# Patient Record
Sex: FEMALE | Race: OTHER
[De-identification: names, ages, dates, MRNs, and addresses within clinical notes are randomized per-mention and may not be internally consistent; named-entity substitution may affect disease eponyms.]

---

## 2019-08-23 ENCOUNTER — HOSPITAL ENCOUNTER (EMERGENCY)
Dept: HOSPITAL 72 - EMR | Age: 24
Discharge: HOME | End: 2019-08-23
Payer: MEDICAID

## 2019-08-23 VITALS — SYSTOLIC BLOOD PRESSURE: 121 MMHG | DIASTOLIC BLOOD PRESSURE: 68 MMHG

## 2019-08-23 VITALS — DIASTOLIC BLOOD PRESSURE: 64 MMHG | SYSTOLIC BLOOD PRESSURE: 122 MMHG

## 2019-08-23 VITALS — BODY MASS INDEX: 32.99 KG/M2 | HEIGHT: 65 IN | WEIGHT: 198 LBS

## 2019-08-23 VITALS — SYSTOLIC BLOOD PRESSURE: 112 MMHG | DIASTOLIC BLOOD PRESSURE: 63 MMHG

## 2019-08-23 DIAGNOSIS — R10.9: ICD-10-CM

## 2019-08-23 DIAGNOSIS — R63.4: ICD-10-CM

## 2019-08-23 DIAGNOSIS — R11.2: ICD-10-CM

## 2019-08-23 DIAGNOSIS — R42: Primary | ICD-10-CM

## 2019-08-23 DIAGNOSIS — M32.9: ICD-10-CM

## 2019-08-23 DIAGNOSIS — R05: ICD-10-CM

## 2019-08-23 LAB
ADD MANUAL DIFF: NO
ALBUMIN SERPL-MCNC: 3.9 G/DL (ref 3.4–5)
ALBUMIN/GLOB SERPL: 0.9 {RATIO} (ref 1–2.7)
ALP SERPL-CCNC: 90 U/L (ref 46–116)
ALT SERPL-CCNC: 22 U/L (ref 12–78)
ANION GAP SERPL CALC-SCNC: 7 MMOL/L (ref 5–15)
APPEARANCE UR: CLEAR
APTT PPP: (no result) S
AST SERPL-CCNC: 21 U/L (ref 15–37)
BASOPHILS NFR BLD AUTO: 1 % (ref 0–2)
BILIRUB SERPL-MCNC: 0.2 MG/DL (ref 0.2–1)
BUN SERPL-MCNC: 13 MG/DL (ref 7–18)
CALCIUM SERPL-MCNC: 9.2 MG/DL (ref 8.5–10.1)
CHLORIDE SERPL-SCNC: 105 MMOL/L (ref 98–107)
CK SERPL-CCNC: 75 U/L (ref 26–308)
CO2 SERPL-SCNC: 26 MMOL/L (ref 21–32)
CREAT SERPL-MCNC: 1 MG/DL (ref 0.55–1.3)
EOSINOPHIL NFR BLD AUTO: 2.6 % (ref 0–3)
ERYTHROCYTE [DISTWIDTH] IN BLOOD BY AUTOMATED COUNT: 12.5 % (ref 11.6–14.8)
GLOBULIN SER-MCNC: 4.3 G/DL
GLUCOSE UR STRIP-MCNC: NEGATIVE MG/DL
HCT VFR BLD CALC: 43.1 % (ref 37–47)
HGB BLD-MCNC: 13.8 G/DL (ref 12–16)
KETONES UR QL STRIP: NEGATIVE
LEUKOCYTE ESTERASE UR QL STRIP: NEGATIVE
LYMPHOCYTES NFR BLD AUTO: 22.1 % (ref 20–45)
MCV RBC AUTO: 88 FL (ref 80–99)
MONOCYTES NFR BLD AUTO: 6 % (ref 1–10)
NEUTROPHILS NFR BLD AUTO: 68.3 % (ref 45–75)
NITRITE UR QL STRIP: NEGATIVE
PH UR STRIP: 8 [PH] (ref 4.5–8)
PHOSPHATE SERPL-MCNC: 3.4 MG/DL (ref 2.5–4.9)
PLATELET # BLD: 353 K/UL (ref 150–450)
POTASSIUM SERPL-SCNC: 3.8 MMOL/L (ref 3.5–5.1)
PROT UR QL STRIP: NEGATIVE
RBC # BLD AUTO: 4.91 M/UL (ref 4.2–5.4)
SODIUM SERPL-SCNC: 138 MMOL/L (ref 136–145)
SP GR UR STRIP: 1.01 (ref 1–1.03)
UROBILINOGEN UR-MCNC: NORMAL MG/DL (ref 0–1)
WBC # BLD AUTO: 10.4 K/UL (ref 4.8–10.8)

## 2019-08-23 PROCEDURE — 74177 CT ABD & PELVIS W/CONTRAST: CPT

## 2019-08-23 PROCEDURE — 81025 URINE PREGNANCY TEST: CPT

## 2019-08-23 PROCEDURE — 82550 ASSAY OF CK (CPK): CPT

## 2019-08-23 PROCEDURE — 71045 X-RAY EXAM CHEST 1 VIEW: CPT

## 2019-08-23 PROCEDURE — 83735 ASSAY OF MAGNESIUM: CPT

## 2019-08-23 PROCEDURE — 80053 COMPREHEN METABOLIC PANEL: CPT

## 2019-08-23 PROCEDURE — 36415 COLL VENOUS BLD VENIPUNCTURE: CPT

## 2019-08-23 PROCEDURE — 84443 ASSAY THYROID STIM HORMONE: CPT

## 2019-08-23 PROCEDURE — 84100 ASSAY OF PHOSPHORUS: CPT

## 2019-08-23 PROCEDURE — 81003 URINALYSIS AUTO W/O SCOPE: CPT

## 2019-08-23 PROCEDURE — 85025 COMPLETE CBC W/AUTO DIFF WBC: CPT

## 2019-08-23 PROCEDURE — 96360 HYDRATION IV INFUSION INIT: CPT

## 2019-08-23 PROCEDURE — 99284 EMERGENCY DEPT VISIT MOD MDM: CPT

## 2019-08-23 NOTE — DIAGNOSTIC IMAGING REPORT
Clinical Indication: Abdominal pain, right-sided abdominal pain, nausea and vomiting

 

Technique:   No oral contrast utilized, per emergency room physician request  IV

administration nonionic contrast. Venous phase spiral acquisition obtained through

the abdomen and pelvis. Multiplanar reconstructions were generated. Total dose length

product 865.62 mGycm. CTDIvol(s) 15.91 mGy. Dose reduction achieved using automated

exposure control

 

 

Comparison: none

 

Findings: Lack of enteric contrast limits assessment of the GI tract. The appendix is

normal. There is equivocal minimal wall thickening of the transverse and distal

descending colon, probably artifact lack of of distention. There is trace free pelvic

fluid. No small bowel distention. No free intraperitoneal gas demonstrated. The

distal esophagus is unremarkable. The stomach is somewhat distended with food. The

duodenum is unremarkable.

 

The liver, gallbladder, bile ducts, pancreas, spleen, adrenals, kidneys are

unremarkable. No renal or ureter calculi, hydronephrosis, or hydroureter

demonstrated. No retroperitoneal or mesenteric mass or adenopathy. No pelvic mass or

adenopathy. Normal uterus and ovaries.

 

The included lung bases are clear. The bones are unremarkable.

 

Impression: Limited assessment of the GI tract, due to lack of enteric contrast

administration.

 

Equivocal minimal wall thickening of the transverse and distal descending colon,

probably artifact lack of distention, but the possibility of colitis should be

considered.

 

Trace free pelvic fluid, presumably physiologic

 

Otherwise unremarkable

 

 

 

 

 

 

 

The CT scanner at [facility] is accredited by the [employer] College of

Radiology and the scans are performed using protocols designed to limit radiation

exposure to as low as reasonably achievable to attain images of sufficient resolution

adequate for diagnostic evaluation.

## 2019-08-23 NOTE — EMERGENCY ROOM REPORT
History of Present Illness


General


Chief Complaint:  Dizziness


Source:  Patient





Present Illness


HPI


Disclaimer: Please note that this report is being documented using DRAGON 

technology. This can lead to erroneous entry secondary to incorrect 

interpretation by the dictating instrument.





HPI: 24-year-old female with history of eczema, multiple pregnancies presents 

for evaluation of nausea/vomiting, diarrhea, lightheadedness, unexplained 

weight loss.  States symptoms have been going on for a month or longer.  She 

states that she has bad almost daily vomiting and nausea both postprandially 

and several hours after eating.  She had an episode of nonbloody diarrhea 

yesterday but this seems to have been resolving.  She states she is feeling 

lightheaded over the past few weeks especially when rising from a seated 

position.  She has fallen several times stating that her knees will buckle.  

She reports intermittent headaches, blurred vision, photophobia and difficulty 

focusing her vision on objects.  She currently denies a headache, chest pain, 

shortness of breath, cough, nausea but states she did vomit just prior to 

arrival.  She denies any dysuria, hematuria, vaginal discharge, vaginal 

bleeding.  She continues to breast-feed.  She had a normal menstrual period 

last month.  Denies any drug or alcohol or tobacco use.  Otherwise only takes 

calcium and magnesium supplements.  Denies any recent fevers, chills, sore 

throat, new rash aside from her eczema.  She also reports a near 20 pound 

weight loss over the past month which is been unintentional.  There is been no 

change in her diet, no change in her exercise level, does not recall any 

fevers.  Denies any issues with thyroid or other hormonal imbalances.


 


PMH: Eczema


 


PSH: Denies


 


Allergies: Seasonal amoxicillin


 


Social Hx: Denies alcohol, drug or tobacco use


Allergies:  


Coded Allergies:  


     AMOXICILLIN (Verified  Allergy, Unknown, 1/26/18)





Patient History


Pregnant Now:  No





Nursing Documentation-PMH


Past Medical History:  No History, Except For


Hx Asthma:  Yes - positive TB test


Hx Neurological Problems:  Yes - LUPUS





Review of Systems


All Other Systems:  negative except mentioned in HPI





Physical Exam





Vital Signs








  Date Time  Temp Pulse Resp B/P (MAP) Pulse Ox O2 Delivery O2 Flow Rate FiO2


 


8/23/19 13:12 98.8 94 18 122/64 (83) 99 Room Air  





 





General: Awake and alert, no acute distress


HEENT: NC/AT. EOMI. PERRLA.  No nystagmus.  Moist mucous membranes.


Neck: Supple, trachea midline


Chest Wall: No tenderness, no deformity


Cardiovascular: RRR.  S1 and S2 normal.  No murmur appreciated


Resp: Normal work of breathing. No cough, wheezing or crackles appreciated


Abdomen: Abdomen is soft, obese, nondistended.  Numbness in the right lower 

quadrant and suprapubic region without rebound.  No right upper quadrant 

tenderness, negative Cruz sign.


Skin: I dry and cracked skin over the back of the neck and upper trapezius 

bilaterally.  No breakdown, no weeping.  There are excoriations over the medial 

malleolus on the right ankle and between the finger webbing of the first and 

second digit on the right hand.


MSK: Normal tone and bulk. Moving all extremities.  No obvious deformity.


Neuro: Awake and alert.  Mentating appropriately.  Facial expressions are 

symmetrical.  Strength is 5/5 in all major joints and muscle groups.  Sensation 

is intact to light touch over the upper and lower extremities.


Back/Spine: No midline tenderness in the cervical, thoracic or lumbosacral 

spine.  Mild paraspinal tenderness and tenderness over the pedis bilaterally





Medical Decision Making


ER Course


24-year-old female presents for evaluation of month-long symptoms including 

postprandial emesis, lightheadedness, weakness, near syncope, headaches, 

abdominal pain, unexplained weight loss.  Given the wide range of symptoms we 

will have to start a full metabolic work-up labs including electrolytes, liver 

function studies, complete blood count, magnesium, phosphorus, UA, pregnancy 

and obtain a CT scan with IV contrast of the abdomen.





Laboratory Tests








Test


  8/23/19


13:34 8/23/19


13:52


 


Urine Color Pale yellow   


 


Urine Appearance Clear   


 


Urine pH 8 (4.5-8.0)   


 


Urine Specific Gravity


  1.010


(1.005-1.035) 


 


 


Urine Protein


  Negative


(NEGATIVE) 


 


 


Urine Glucose (UA)


  Negative


(NEGATIVE) 


 


 


Urine Ketones


  Negative


(NEGATIVE) 


 


 


Urine Blood


  Negative


(NEGATIVE) 


 


 


Urine Nitrite


  Negative


(NEGATIVE) 


 


 


Urine Bilirubin


  Negative


(NEGATIVE) 


 


 


Urine Urobilinogen


  Normal MG/DL


(0.0-1.0) 


 


 


Urine Leukocyte Esterase


  Negative


(NEGATIVE) 


 


 


Urine HCG, Qualitative


  Negative


(NEGATIVE) 


 


 


White Blood Count


  


  10.4 K/UL


(4.8-10.8)


 


Red Blood Count


  


  4.91 M/UL


(4.20-5.40)


 


Hemoglobin


  


  13.8 G/DL


(12.0-16.0)


 


Hematocrit


  


  43.1 %


(37.0-47.0)


 


Mean Corpuscular Volume  88 FL (80-99)  


 


Mean Corpuscular Hemoglobin


  


  28.2 PG


(27.0-31.0)


 


Mean Corpuscular Hemoglobin


Concent 


  32.0 G/DL


(32.0-36.0)


 


Red Cell Distribution Width


  


  12.5 %


(11.6-14.8)


 


Platelet Count


  


  353 K/UL


(150-450)


 


Mean Platelet Volume


  


  6.4 FL


(6.5-10.1)  L


 


Neutrophils (%) (Auto)


  


  68.3 %


(45.0-75.0)


 


Lymphocytes (%) (Auto)


  


  22.1 %


(20.0-45.0)


 


Monocytes (%) (Auto)


  


  6.0 %


(1.0-10.0)


 


Eosinophils (%) (Auto)


  


  2.6 %


(0.0-3.0)


 


Basophils (%) (Auto)


  


  1.0 %


(0.0-2.0)


 


Sodium Level


  


  138 MMOL/L


(136-145)


 


Potassium Level


  


  3.8 MMOL/L


(3.5-5.1)


 


Chloride Level


  


  105 MMOL/L


()


 


Carbon Dioxide Level


  


  26 MMOL/L


(21-32)


 


Anion Gap


  


  7 mmol/L


(5-15)


 


Blood Urea Nitrogen


  


  13 mg/dL


(7-18)


 


Creatinine


  


  1.0 MG/DL


(0.55-1.30)


 


Estimate Glomerular


Filtration Rate 


  > 60 mL/min


(>60)


 


Glucose Level


  


  100 MG/DL


()


 


Calcium Level


  


  9.2 MG/DL


(8.5-10.1)


 


Phosphorus Level


  


  3.4 MG/DL


(2.5-4.9)


 


Magnesium Level


  


  1.8 MG/DL


(1.8-2.4)


 


Total Bilirubin


  


  0.2 MG/DL


(0.2-1.0)


 


Aspartate Amino Transferase


(AST) 


  21 U/L (15-37)


 


 


Alanine Aminotransferase (ALT)


  


  22 U/L (12-78)


 


 


Alkaline Phosphatase


  


  90 U/L


()


 


Total Creatine Kinase


  


  75 U/L


()


 


Total Protein


  


  8.2 G/DL


(6.4-8.2)


 


Albumin


  


  3.9 G/DL


(3.4-5.0)


 


Globulin  4.3 g/dL  


 


Albumin/Globulin Ratio


  


  0.9 (1.0-2.7)


L


 


Thyroid Stimulating Hormone


(TSH) 


  1.668 uiU/mL


(0.358-3.740)








EKG Diagnostic Results


EKG Time:  13:55


Rate:  tachycardiac


Rhythm:  NSR


ST Segments:  no acute changes


Other Impression


Sinus tachycardia with a rate of 102 bpm, normal intervals, normal axis.  No 

acute ischemic changes.





Rhythm Strip Diag. Results


Rhythm Strip Time:  13:55


EP Interpretation:  yes


Rate:  100s


Rhythm:  NSR





Chest X-Ray Diagnostic Results


Chest X-Ray Diagnostic Results :  


   # of Views/Limited/Complete:  1 View


   Indication:  Other - Weight loss


   EP Interpretation:  Yes


   Interpretation:  no consolidation, no effusion, no pneumothorax


   Impression:  No acute disease - No masses appreciated


   Electronically Signed by:  Electronically signed by Dr. Tonio Gan





CT/MRI/US Diagnostic Results


CT/MRI/US Diagnostic Results :  


   Impression


CT abd/pel Impression: Limited assessment of the GI tract, due to lack of 

enteric contrast


administration.


 


Equivocal minimal wall thickening of the transverse and distal descending colon,


probably artifact lack of distention, but the possibility of colitis should be


considered.


 


Trace free pelvic fluid, presumably physiologic


 


Otherwise unremarkable





Last Vital Signs








  Date Time  Temp Pulse Resp B/P (MAP) Pulse Ox O2 Delivery O2 Flow Rate FiO2


 


8/23/19 13:18 98.8  18 122/64 99 Room Air  


 


8/23/19 13:12  94      








Status:  improved


Reevaluation Impression


Blood work returned largely within normal limits.  No significant white count, 

normal renal function, no evidence of urinary tract infection.  CT scan of the 

abdomen showed possible inflammation of the distal transverse colon however 

they noted that this may be artifact.  Given the patient's prolonged symptoms 

and inflammatory condition such as Crohn's, ulcerative colitis, celiac disease 

or others may be the cause of her abdominal discomfort, nausea and vomiting.  

She states her symptoms are now completely resolved and she is returned to her 

baseline.  No lightheadedness or other symptoms reported at this time.  She is 

requesting discharge home on NSAIDs and she will follow-up with her PMD to 

discuss these findings and possible schedule outpatient evaluation by 

gastroenterology as needed.  We discussed reasons to return to the emergency 

department.  The patient was provided with a copy of her CT scan.  She 

understands and agrees with this treatment plan will be discharged home.


Disposition:  HOME, SELF-CARE


Condition:  Improved


Scripts


Ibuprofen* (MOTRIN*) 600 Mg Tablet


600 MG ORAL Q6H PRN for For Pain, #30 TAB 0 Refills


   Prov: Tonio Gan MD         8/23/19











Tonio Gan MD Aug 23, 2019 14:05

## 2019-08-23 NOTE — DIAGNOSTIC IMAGING REPORT
Indication: Cough

 

Technique: One view of the chest

 

Comparison: 9/14/2009

 

Findings: Lungs and pleural spaces are clear. Heart size is normal

 

Impression: No acute process

## 2019-08-23 NOTE — NUR
ED Nurse Note:

pt from home c/o abd pain and dizziness ermd eval done meds given labs sent vss 
ct done pt resting awaiting results

## 2020-10-27 ENCOUNTER — HOSPITAL ENCOUNTER (EMERGENCY)
Dept: HOSPITAL 72 - EMR | Age: 25
Discharge: HOME | End: 2020-10-27
Payer: MEDICAID

## 2020-10-27 VITALS — HEIGHT: 65 IN | WEIGHT: 220 LBS | BODY MASS INDEX: 36.65 KG/M2

## 2020-10-27 VITALS — DIASTOLIC BLOOD PRESSURE: 76 MMHG | SYSTOLIC BLOOD PRESSURE: 115 MMHG

## 2020-10-27 VITALS — SYSTOLIC BLOOD PRESSURE: 115 MMHG | DIASTOLIC BLOOD PRESSURE: 76 MMHG

## 2020-10-27 DIAGNOSIS — R42: ICD-10-CM

## 2020-10-27 DIAGNOSIS — R51.9: Primary | ICD-10-CM

## 2020-10-27 DIAGNOSIS — Z88.1: ICD-10-CM

## 2020-10-27 LAB
APPEARANCE UR: CLEAR
APTT PPP: (no result) S
GLUCOSE UR STRIP-MCNC: NEGATIVE MG/DL
KETONES UR QL STRIP: NEGATIVE
LEUKOCYTE ESTERASE UR QL STRIP: NEGATIVE
NITRITE UR QL STRIP: NEGATIVE
PH UR STRIP: 6 [PH] (ref 4.5–8)
PROT UR QL STRIP: NEGATIVE
SP GR UR STRIP: 1.02 (ref 1–1.03)
UROBILINOGEN UR-MCNC: NORMAL MG/DL (ref 0–1)

## 2020-10-27 PROCEDURE — 96374 THER/PROPH/DIAG INJ IV PUSH: CPT

## 2020-10-27 PROCEDURE — 99284 EMERGENCY DEPT VISIT MOD MDM: CPT

## 2020-10-27 PROCEDURE — 81025 URINE PREGNANCY TEST: CPT

## 2020-10-27 PROCEDURE — 96375 TX/PRO/DX INJ NEW DRUG ADDON: CPT

## 2020-10-27 PROCEDURE — 70450 CT HEAD/BRAIN W/O DYE: CPT

## 2020-10-27 PROCEDURE — 81003 URINALYSIS AUTO W/O SCOPE: CPT

## 2020-10-27 NOTE — NUR
ED Nurse Note:



PT walked in c/o headache and LT eye blurry vision for 2 weeks. Patient denies 
CP, nausea vomitting diarrhea. reports recent psychosocial stressors prior to 
onset of symptoms. changed into gown; attached to monitor. patient ao4 with no 
acute distress. vitals stable ambulatory with steady gait. urine collected; 
sent down to lab. all safety measures met.

## 2020-10-27 NOTE — NUR
ED Nurse Note:

iv access established. blood and urine collected; sent down to lab. medicated 
patient; tolerated well. decreased environmental stimuli; dimmed lights; 
provided with warm blankets.

## 2020-10-27 NOTE — EMERGENCY ROOM REPORT
History of Present Illness


General


Chief Complaint:  Dizziness


Source:  Patient





Present Illness


HPI


This is a 25-year-old female with no past medical history.  She presents when 

she complained of headache and dizziness.  She says she occasionally get 

headache but recently in the last week or so assuming getting it daily.  Usually

around her eyes waiting for back.  She felt nauseous with vomiting.  Worse with 

lights and noise.  Increased stress lately.  She said that her mom was 

hospitalized for Covid and still recovering from that.  Denies any fever chills 

but denies any focal deficit.  Pain is 8 out of 10.  Usually take 

over-the-counter medication but is not helping now.  Never had any work-up as 

far as a CT scan or MRI.


Allergies:  


Coded Allergies:  


     AMOXICILLIN (Verified  Allergy, Unknown, 1/26/18)





COVID-19 Screening


Contact w/high risk pt:  No


Experienced COVID-19 symptoms?:  No


COVID-19 Testing performed PTA:  No





Patient History


Past Medical History:  see triage record, old chart reviewed


Past Surgical History:  none


Pertinent Family History:  none


Social History:  Denies: smoking


Last Menstrual Period:  10/2020


Pregnant Now:  No


Immunizations:  other


Reviewed Nursing Documentation:  PMH: Agreed; PSxH: Agreed





Nursing Documentation-PMH


Hx Asthma:  Yes - false positive TB test


Hx Neurological Problems:  Yes - LUPUS, eczema





Review of Systems


Eye:  Denies: eye pain, blurred vision


ENT:  Denies: ear pain, nose congestion, throat swelling


Respiratory:  Denies: cough, shortness of breath


Cardiovascular:  Denies: chest pain, palpitations


Gastrointestinal:  Denies: abdominal pain, diarrhea, nausea, vomiting


Musculoskeletal:  Denies: back pain, joint pain


Skin:  Denies: rash


Neurological:  Reports: headache; Denies: numbness


Endocrine:  Denies: increased thirst, increased urine


Hematologic/Lymphatic:  Denies: easy bruising


All Other Systems:  negative except mentioned in HPI





Physical Exam





Vital Signs








  Date Time  Temp Pulse Resp B/P (MAP) Pulse Ox O2 Delivery O2 Flow Rate FiO2


 


10/27/20 00:58 98.4 74 16 115/76 (89) 95 Room Air  





Vitals normal


Sp02 EP Interpretation:  reviewed, normal


General Appearance:  well appearing, no apparent distress, alert


Head:  normocephalic, atraumatic


Eyes:  bilateral eye PERRL, bilateral eye EOMI


ENT:  hearing grossly normal, normal pharynx


Neck:  full range of motion, supple, no meningismus


Respiratory:  chest non-tender, lungs clear, normal breath sounds


Cardiovascular #1:  regular rate, rhythm, no murmur


Gastrointestinal:  normal bowel sounds, non tender, no mass, no organomegaly, no

bruit, non-distended


Musculoskeletal:  back normal, normal range of motion, gait/station normal


Psychiatric:  mood/affect normal





Medical Decision Making


Diagnostic Impression:  


   Primary Impression:  


   Headache


   Qualified Codes:  R51.9 - Headache, unspecified


ER Course


Patient presents with headache.  This may be migrainous or tension in nature.  

Worse with anxiety related.  She looks well.  No evidence of meningitis, bleed 

or neoplastic process.  Order CT scan because headache is getting worse and she 

never had any work-up.


CT/MRI/US Diagnostic Results


CT/MRI/US Diagnostic Results :  


   Imaging Test Ordered:  CT head


   Impression


Per radiologist negative





Last Vital Signs








  Date Time  Temp Pulse Resp B/P (MAP) Pulse Ox O2 Delivery O2 Flow Rate FiO2


 


10/27/20 01:08  74 16   Room Air  


 


10/27/20 01:08 98.4   115/76 95   








Status:  improved


Disposition:  HOME, SELF-CARE


Condition:  Stable


Scripts


Meloxicam* (MOBIC*) 15 Mg Tablet


15 MG ORAL DAILY, #30 TAB 0 Refills


   Prov: José Antonio Dodge MD         10/27/20 


Amitriptyline Hcl (AMITRIPTYLINE HCL) 50 Mg Tablet


50 MG ORAL BEDTIME, #30 TAB


   Prov: José Antonio Dodge MD         10/27/20





Additional Instructions:  


Follow-up with  In 7 days.  If you continue with headache, you may need 

referral to see a neurologist.  Return if symptoms worsen.











José Antonio Dodge MD                  Oct 27, 2020 01:27

## 2020-10-27 NOTE — DIAGNOSTIC IMAGING REPORT
EXAM:

  CT Head Without Intravenous Contrast

 

CLINICAL HISTORY:

  PAIN

 

TECHNIQUE:

  Axial computed tomography images of the head/brain without intravenous 

contrast.  CTDI is 53.4 mGy and DLP is 1045.5 mGy-cm.  One or more of the 

following dose reduction techniques were used: automated exposure control,

 adjustment of the mA and/or kV according to patient size, use of 

iterative reconstruction technique.

 

COMPARISON:

  No relevant prior studies available.

 

FINDINGS:

  Brain:  See below.  

  Brainstem:  Focal hypodensity in the central aspect of the valdez which 

appears to extend into the central midbrain.  This may partly reflect 

artifact.  Recommend clinical correlation and follow-up MR for better 

visualization of this region.

  Ventricles:  Unremarkable.  No ventriculomegaly.

  Bones/joints:  Unremarkable.  No acute fracture.

  Soft tissues:  Unremarkable.

  Sinuses:  Unremarkable as visualized.  No acute sinusitis.

  Mastoid air cells:  Unremarkable as visualized.  No mastoid effusion.

 

IMPRESSION:     

1.  No acute hemorrhage, mass effect or midline shift.

2.  Finding in the midbrain and central valdez which may reflect artifact.

3.  Eventual follow-up MR for further evaluation and better visualization 

as indicated

## 2020-10-30 ENCOUNTER — HOSPITAL ENCOUNTER (EMERGENCY)
Dept: HOSPITAL 72 - EMR | Age: 25
Discharge: HOME | End: 2020-10-30
Payer: MEDICAID

## 2020-10-30 VITALS — HEIGHT: 65 IN | BODY MASS INDEX: 36.49 KG/M2 | WEIGHT: 219 LBS

## 2020-10-30 VITALS — DIASTOLIC BLOOD PRESSURE: 79 MMHG | SYSTOLIC BLOOD PRESSURE: 122 MMHG

## 2020-10-30 VITALS — DIASTOLIC BLOOD PRESSURE: 78 MMHG | SYSTOLIC BLOOD PRESSURE: 118 MMHG

## 2020-10-30 DIAGNOSIS — R51.9: Primary | ICD-10-CM

## 2020-10-30 DIAGNOSIS — Z88.1: ICD-10-CM

## 2020-10-30 DIAGNOSIS — H53.149: ICD-10-CM

## 2020-10-30 DIAGNOSIS — R20.2: ICD-10-CM

## 2020-10-30 LAB
ADD MANUAL DIFF: NO
ALBUMIN SERPL-MCNC: 3.7 G/DL (ref 3.4–5)
ALBUMIN/GLOB SERPL: 0.9 {RATIO} (ref 1–2.7)
ALP SERPL-CCNC: 96 U/L (ref 46–116)
ALT SERPL-CCNC: 46 U/L (ref 12–78)
ANION GAP SERPL CALC-SCNC: 9 MMOL/L (ref 5–15)
APPEARANCE UR: CLEAR
APTT PPP: (no result) S
AST SERPL-CCNC: 26 U/L (ref 15–37)
BASOPHILS NFR BLD AUTO: 1.1 % (ref 0–2)
BILIRUB SERPL-MCNC: 0.2 MG/DL (ref 0.2–1)
BUN SERPL-MCNC: 14 MG/DL (ref 7–18)
CALCIUM SERPL-MCNC: 8.5 MG/DL (ref 8.5–10.1)
CHLORIDE SERPL-SCNC: 106 MMOL/L (ref 98–107)
CO2 SERPL-SCNC: 26 MMOL/L (ref 21–32)
CREAT SERPL-MCNC: 0.8 MG/DL (ref 0.55–1.3)
EOSINOPHIL NFR BLD AUTO: 3.1 % (ref 0–3)
ERYTHROCYTE [DISTWIDTH] IN BLOOD BY AUTOMATED COUNT: 14.1 % (ref 11.6–14.8)
GLOBULIN SER-MCNC: 4 G/DL
GLUCOSE UR STRIP-MCNC: NEGATIVE MG/DL
HCT VFR BLD CALC: 40.4 % (ref 37–47)
HGB BLD-MCNC: 13 G/DL (ref 12–16)
KETONES UR QL STRIP: NEGATIVE
LEUKOCYTE ESTERASE UR QL STRIP: NEGATIVE
LYMPHOCYTES NFR BLD AUTO: 23.5 % (ref 20–45)
MCV RBC AUTO: 89 FL (ref 80–99)
MONOCYTES NFR BLD AUTO: 5.7 % (ref 1–10)
NEUTROPHILS NFR BLD AUTO: 66.6 % (ref 45–75)
NITRITE UR QL STRIP: NEGATIVE
PH UR STRIP: 8 [PH] (ref 4.5–8)
PLATELET # BLD: 313 K/UL (ref 150–450)
POTASSIUM SERPL-SCNC: 3.9 MMOL/L (ref 3.5–5.1)
PROT UR QL STRIP: NEGATIVE
RBC # BLD AUTO: 4.55 M/UL (ref 4.2–5.4)
SODIUM SERPL-SCNC: 141 MMOL/L (ref 136–145)
SP GR UR STRIP: 1.01 (ref 1–1.03)
UROBILINOGEN UR-MCNC: NORMAL MG/DL (ref 0–1)
WBC # BLD AUTO: 9.9 K/UL (ref 4.8–10.8)

## 2020-10-30 PROCEDURE — 93005 ELECTROCARDIOGRAM TRACING: CPT

## 2020-10-30 PROCEDURE — 96361 HYDRATE IV INFUSION ADD-ON: CPT

## 2020-10-30 PROCEDURE — 81025 URINE PREGNANCY TEST: CPT

## 2020-10-30 PROCEDURE — 81003 URINALYSIS AUTO W/O SCOPE: CPT

## 2020-10-30 PROCEDURE — 70551 MRI BRAIN STEM W/O DYE: CPT

## 2020-10-30 PROCEDURE — 96374 THER/PROPH/DIAG INJ IV PUSH: CPT

## 2020-10-30 PROCEDURE — 85025 COMPLETE CBC W/AUTO DIFF WBC: CPT

## 2020-10-30 PROCEDURE — 99284 EMERGENCY DEPT VISIT MOD MDM: CPT

## 2020-10-30 PROCEDURE — 80053 COMPREHEN METABOLIC PANEL: CPT

## 2020-10-30 PROCEDURE — 36415 COLL VENOUS BLD VENIPUNCTURE: CPT

## 2020-10-30 NOTE — NUR
ED Nurse Note:



Pt ambulated to ED from home d/t dizziness that goes on and off for a couple of 
weeks accompanied by frontal headache and back muscle spasms. Pt is AOx4, calm 
and cooperative to care, denies any recent injury/trauma, VSS, on RA, afebrile 
on triage. Pt was placed on bed and gown; hooked to cardiac monitor, family 
member at bedside.

## 2020-10-30 NOTE — EMERGENCY ROOM REPORT
History of Present Illness


General


Chief Complaint:  Dizziness


Source:  Patient





Present Illness


HPI


25-year-old female here with headache and focal neurologic deficits.  Patient 

says that for the past several weeks she has been exhibiting numbness and 

paresthesias of her upper neck and right upper extremity.  She was here in the 

emergency department 3 days ago where she had a CT scan performed that showed a 

questionable finding versus artifact in the central valdez.  Patient however left 

AGAINST MEDICAL ADVICE before the results were obtained by radiology.  Patient 

says that over the past 24 hours she has been exhibiting worsening headaches and

had decreased visual acuity in the left eye.  Headache is diffuse in nature, 

throbbing, associated with photophobia.  She has been using Motrin with only 

mild intermittent relief.  She still feeling paresthesias on the right side of 

her upper back rating to her right shoulder and right upper extremity.


Allergies:  


Coded Allergies:  


     AMOXICILLIN (Verified  Allergy, Unknown, 1/26/18)





COVID-19 Screening


Contact w/high risk pt:  No


Experienced COVID-19 symptoms?:  No


COVID-19 Testing performed PTA:  No





Patient History


Pregnant Now:  No





Nursing Documentation-[facility]


Past Medical History:  No History, Except For


Hx Asthma:  Yes - false positive TB test


Hx Neurological Problems:  Yes - LUPUS, eczema





Review of Systems


All Other Systems:  negative except mentioned in HPI





Physical Exam





Vital Signs








  Date Time  Temp Pulse Resp B/P (MAP) Pulse Ox O2 Delivery O2 Flow Rate FiO2


 


10/30/20 12:13 98.8 73 16 122/79 (93) 99 Room Air  








Sp02 EP Interpretation:  reviewed, normal


General Appearance:  alert, non-toxic, mild distress, other - Appears in mild 

discomfort secondary to photophobia


Head:  normocephalic, atraumatic


Eyes:  bilateral eye normal inspection, bilateral eye PERRL


ENT:  hearing grossly normal, normal pharynx, no angioedema, normal voice


Neck:  full range of motion, supple/symm/no masses


Respiratory:  chest non-tender, lungs clear, normal breath sounds, speaking full

sentences


Cardiovascular #1:  regular rate, rhythm, no edema


Cardiovascular #2:  2+ carotid (R), 2+ carotid (L), 2+ radial (R), 2+ radial 

(L), 2+ dorsalis pedis (R), 2+ dorsalis pedis (L)


Gastrointestinal:  normal bowel sounds, non tender, soft, non-distended, no 

guarding, no rebound


Rectal:  deferred


Genitourinary:  normal inspection, no CVA tenderness


Musculoskeletal:  back normal, normal range of motion, calf tenderness, 

gait/station normal, non-tender


Neurologic:  alert, motor strength/tone normal, oriented x3, sensory intact, 

responsive, speech normal, other - Subjective weakness of the right upper 

extremity, but normal 5 out of 5 strength of bilateral extremities


Psychiatric:  judgement/insight normal, memory normal, mood/affect normal, no 

suicidal/homicidal ideation


Lymphatic:  no adenopathy





Medical Decision Making


ER Course


25-year-old female here with multiple neurologic complaints and headache.  

Review of patient's previous CT scan performed 3 days ago showed possible 

artifact versus abnormal finding in the valdez.  MRI was recommended.  Patient is 

currently undergoing MRI for further elucidation of this possible abnormal 

finding on CT scan.  She had a largely normal physical examination but had 

subjective weakness and paresthesias of the right upper extremity.  She did 

however have normal strength on physical examination.  Signed out to oncoming 

physician Dr. Yousif.





Last Vital Signs








  Date Time  Temp Pulse Resp B/P (MAP) Pulse Ox O2 Delivery O2 Flow Rate FiO2


 


10/30/20 12:45 98.8  16 122/79 99 Room Air  


 


10/30/20 12:45  73      








Referrals:  


NON PHYSICIAN (PCP)











Vincenzo Talamantes M.D.                Oct 30, 2020 14:06

## 2020-10-30 NOTE — DIAGNOSTIC IMAGING REPORT
Indication: Or fracture headaches

 

Technique: sagittal T1 fast spin echo, axial T1 FLAIR, axial T2 FLAIR, axial T2 FS

PROPELLER, axial T2* GRE, axial diffusion weighted images. ADC and exponential ADC

maps generated

 

Comparison: No comparison MRI. Reference made to CT scan dated 10/27/2020

 

Findings: No findings referable to the pontine abnormality described on prior CT

scan. This area. Normal; that finding was probably artifactual.  No abnormal areas of

restricted diffusion to suggest acute infarction. No acute hemorrhage or edema. No

mass effect nor midline shift there is vascular flow voids are preserved. Normal size

ventricles and extra axial CSF spaces. Visualized orbits and sinuses are

unremarkable.

 

Impression: Negative

## 2020-10-30 NOTE — EMERGENCY ROOM REPORT
Physical Exam





Vital Signs








  Date Time  Temp Pulse Resp B/P (MAP) Pulse Ox O2 Delivery O2 Flow Rate FiO2


 


10/30/20 12:13 98.8 73 16 122/79 (93) 99 Room Air  











Medical Decision Making


Diagnostic Impression:  


   Primary Impression:  


   Headache


ER Course


Patient endorsed to me pending MRI





Labs unremarkable





Had CT done a few days ago which showed some artifact possible abnormality.  

Patient had left AMA at that time





MRI is unremarkable with discussion with radiologist





discussed with patient and mother at bedside.  Safe for discharge close 

outpatient follow-up.  Mother states she does have migraines as well.





States she will follow-up with her PMD.





Laboratory Tests








Test


 10/30/20


12:30


 


White Blood Count


 9.9 K/UL


(4.8-10.8)


 


Red Blood Count


 4.55 M/UL


(4.20-5.40)


 


Hemoglobin


 13.0 G/DL


(12.0-16.0)


 


Hematocrit


 40.4 %


(37.0-47.0)


 


Mean Corpuscular Volume 89 FL (80-99)  


 


Mean Corpuscular Hemoglobin


 28.5 PG


(27.0-31.0)


 


Mean Corpuscular Hemoglobin


Concent 32.1 G/DL


(32.0-36.0)


 


Red Cell Distribution Width


 14.1 %


(11.6-14.8)


 


Platelet Count


 313 K/UL


(150-450)


 


Mean Platelet Volume


 7.5 FL


(6.5-10.1)


 


Neutrophils (%) (Auto)


 66.6 %


(45.0-75.0)


 


Lymphocytes (%) (Auto)


 23.5 %


(20.0-45.0)


 


Monocytes (%) (Auto)


 5.7 %


(1.0-10.0)


 


Eosinophils (%) (Auto)


 3.1 %


(0.0-3.0)  H


 


Basophils (%) (Auto)


 1.1 %


(0.0-2.0)


 


Urine Color Pale yellow  


 


Urine Appearance Clear  


 


Urine pH 8 (4.5-8.0)  


 


Urine Specific Gravity


 1.010


(1.005-1.035)


 


Urine Protein


 Negative


(NEGATIVE)


 


Urine Glucose (UA)


 Negative


(NEGATIVE)


 


Urine Ketones


 Negative


(NEGATIVE)


 


Urine Blood


 5+ (NEGATIVE)


H


 


Urine Nitrite


 Negative


(NEGATIVE)


 


Urine Bilirubin


 Negative


(NEGATIVE)


 


Urine Urobilinogen


 Normal MG/DL


(0.0-1.0)


 


Urine Leukocyte Esterase


 Negative


(NEGATIVE)


 


Urine RBC


 5-10 /HPF (0 -


2)  H


 


Urine WBC


 0 /HPF (0 - 2)





 


Urine Squamous Epithelial


Cells Few /LPF


(NONE/OCC)


 


Urine Bacteria


 Occasional


/HPF (NONE)


 


Urine HCG, Qualitative


 Negative


(NEGATIVE)


 


Sodium Level


 141 MMOL/L


(136-145)


 


Potassium Level


 3.9 MMOL/L


(3.5-5.1)


 


Chloride Level


 106 MMOL/L


()


 


Carbon Dioxide Level


 26 MMOL/L


(21-32)


 


Anion Gap


 9 mmol/L


(5-15)


 


Blood Urea Nitrogen


 14 mg/dL


(7-18)


 


Creatinine


 0.8 MG/DL


(0.55-1.30)


 


Estimat Glomerular Filtration


Rate > 60 mL/min


(>60)


 


Glucose Level


 85 MG/DL


()


 


Calcium Level


 8.5 MG/DL


(8.5-10.1)


 


Total Bilirubin


 0.2 MG/DL


(0.2-1.0)


 


Aspartate Amino Transf


(AST/SGOT) 26 U/L (15-37)





 


Alanine Aminotransferase


(ALT/SGPT) 46 U/L (12-78)





 


Alkaline Phosphatase


 96 U/L


()


 


Total Protein


 7.7 G/DL


(6.4-8.2)


 


Albumin


 3.7 G/DL


(3.4-5.0)


 


Globulin 4.0 g/dL  


 


Albumin/Globulin Ratio


 0.9 (1.0-2.7)


L








CT/MRI/US Diagnostic Results


CT/MRI/US Diagnostic Results :  


   Imaging Test Ordered:  MRI brain


   Impression


no acute process





Last Vital Signs








  Date Time  Temp Pulse Resp B/P (MAP) Pulse Ox O2 Delivery O2 Flow Rate FiO2


 


10/30/20 16:20 98.8 87 17 118/78 100 Room Air  








Status:  improved


Disposition:  HOME, SELF-CARE


Condition:  Stable


Scripts


Acetaminophen* (TYLENOL EXTRA STRENGTH*) 500 Mg Tablet


500 MG ORAL Q8H PRN for Prn Headache/Temp > 101, #30 TAB 0 Refills


   Prov: Topher Yousif MD         10/30/20


Referrals:  


NON PHYSICIAN (PCP)











[facility]


Patient Instructions:  Migraine Headache, Easy-to-Read











Topher Yousif MD            Oct 30, 2020 16:55